# Patient Record
Sex: MALE | Race: BLACK OR AFRICAN AMERICAN
[De-identification: names, ages, dates, MRNs, and addresses within clinical notes are randomized per-mention and may not be internally consistent; named-entity substitution may affect disease eponyms.]

---

## 2023-02-01 RX ORDER — LABETALOL 200 MG/1
100 TABLET, FILM COATED ORAL 2 TIMES DAILY
COMMUNITY

## 2023-02-01 RX ORDER — AMLODIPINE BESYLATE 10 MG/1
10 TABLET ORAL DAILY
COMMUNITY

## 2023-02-01 RX ORDER — LOSARTAN POTASSIUM 50 MG/1
50 TABLET ORAL DAILY
COMMUNITY

## 2024-08-06 ENCOUNTER — HOSPITAL ENCOUNTER (OUTPATIENT)
Facility: HOSPITAL | Age: 54
Setting detail: RECURRING SERIES
Discharge: HOME | End: 2024-08-09
Payer: COMMERCIAL

## 2024-08-06 PROCEDURE — 97161 PT EVAL LOW COMPLEX 20 MIN: CPT

## 2024-08-06 PROCEDURE — 97110 THERAPEUTIC EXERCISES: CPT

## 2024-08-06 NOTE — PROGRESS NOTES
DE GARCIA [facility]  [address], [facility], [address] Phone: [phone] Fax [phone]  Plan of Care / Statement of Necessity for Physical Therapy Services     Patient Name: Odilon Ballesteros : 1970   Medical   Diagnosis: *Pain in thoracic spine [M54.6] Treatment Diagnosis: M54.2  NECK PAIN and M54.6  THORACIC PAIN     Onset Date: 24 Payor: Payor: HAMZAH / Plan: [facility] / Product Type: *No Product type* /    Referral Source: Nano Ji PA-C Start of Care (SOC): 2024   Prior Hospitalization: See medical history Provider #: [id number]   Prior Level of Function: IND, works as an educator   Comorbidities:  HTN, OA       Assessment / key information:     Pt is a 55 y/o male presenting to OP PT with c/o atraumatic L>R upper back and neck pain (10/10 at worst) along with intermittent numbness in the L UE. S/sx are consistent with cervicalgia and potential radiculopathy.  Upon evaluation, the pt presents with mod postural deficits, limited and painful cervical AROM, thoracic ext/rotation mobility deficits, medial scapular muscle strength deficits. Spurling's test was positive for symptom reproduction on the L. Cervical myotomes tested WNL bilat. Pt will benefit from skilled physical therapy to address the functional deficits present so he may progress towards his PLOF.     Co-morbidities: HTN, OA    Allergies: NKDA     OBJECTIVE    Posture:   Increased thoracic kyphosis, forward head and rounded shoulders    Cervical AROM:   Flexion: 45  Extension: 34  SB R: 16   SB L: 22  Rotation L: WNL but painful on L side of neck  Rotation R:  WNL but painful on L side of neck    Cervical Myotomes: WNL bilat     Spurlings: (+) L for symptom reproduction, (-) R    Thoracic extension/rotation mobility: 75% limited bilat      Medial scapular MMT:   Upper trap: 4+/5  Mid trap: 4-/5 bilat  Low trap: 4-/5 bilat     Evaluation Complexity:  History:

## 2024-08-06 NOTE — PROGRESS NOTES
PHYSICAL / OCCUPATIONAL THERAPY - DAILY TREATMENT NOTE (updated )  For Eval visit    Patient Name: Odilon Ballesteros    Date: 2024    : 1970  Insurance: Payor: HAMZAH / Plan: [facility] / Product Type: *No Product type* /      Patient  verified yes     Visit #   Current / Total 1 10   Time   In / Out 2:55 4:00   Total Treatment  Time  65   Pain   In / Out 5 5   Subjective Functional Status/Changes: See below     NEXT PROGRESS NOTE DUE: 2024    TREATMENT AREA =  Pain in thoracic spine [M54.6]    SUBJECTIVE:   Pt is a 53 y/o male presenting with c/o upper back and neck pain along with intermittent numbness in the L UE. Symptoms onset \"a few months ago\" without incident per pt report. He notes having a h/o L carpal tunnel diagnosis around . He was prescribed Gabapentin, but he did not feel comfortable taking it. For the current complaints, pt initially went to the ER for concerns of a heart attack, as his symptoms were spanning from the L side of the neck, chest, and to the fingertips. He was cleared by cardiology. He no longer has any anterior chest wall pain. Currently, he gets occasional zapping sensations along the ulnar nerve distribution. No h/o shoulder injuries.     Co-morbidities: HTN,      Allergies: NKDA     OBJECTIVE    Posture:   Increased thoracic kyphosis, forward head and rounded shoulders    Cervical AROM:   Flexion: 45  Extension: 34  SB R: 16   SB L: 22  Rotation L: WNL but painful on L side of neck  Rotation R:  WNL but painful on L side of neck    Cervical Myotomes: WNL bilat     Spurlings: (+) L for symptom reproduction, (-) R    Thoracic extension/rotation mobility: 75% limited bilat      Medial scapular MMT:   Upper trap: 4+/5  Mid trap: 4-/5 bilat  Low trap: 4-/5 bilat         50 min   Eval - untimed                      Therapeutic Procedures:  Tx Min Billable or 1:1 Min (if diff from Tx Min) Procedure, Rationale, Specifics   15 15 [id number] Therapeutic Exercise (timed):

## 2024-08-19 ENCOUNTER — APPOINTMENT (OUTPATIENT)
Facility: HOSPITAL | Age: 54
End: 2024-08-19
Payer: COMMERCIAL

## 2024-08-22 ENCOUNTER — APPOINTMENT (OUTPATIENT)
Facility: HOSPITAL | Age: 54
End: 2024-08-22
Payer: COMMERCIAL

## 2024-08-27 ENCOUNTER — APPOINTMENT (OUTPATIENT)
Facility: HOSPITAL | Age: 54
End: 2024-08-27
Payer: COMMERCIAL